# Patient Record
Sex: MALE | URBAN - METROPOLITAN AREA
[De-identification: names, ages, dates, MRNs, and addresses within clinical notes are randomized per-mention and may not be internally consistent; named-entity substitution may affect disease eponyms.]

---

## 2020-06-05 ENCOUNTER — OFFICE VISIT (OUTPATIENT)
Dept: URBAN - METROPOLITAN AREA CLINIC 98 | Facility: CLINIC | Age: 80
End: 2020-06-05
Payer: MEDICARE

## 2020-06-05 DIAGNOSIS — R10.84 GENERALIZED ABDOMINAL PAIN: ICD-10-CM

## 2020-06-05 DIAGNOSIS — K59.01 CONSTIPATION BY DELAYED COLONIC TRANSIT: ICD-10-CM

## 2020-06-05 PROCEDURE — 99203 OFFICE O/P NEW LOW 30 MIN: CPT | Performed by: INTERNAL MEDICINE

## 2020-06-05 PROCEDURE — 99204 OFFICE O/P NEW MOD 45 MIN: CPT | Performed by: INTERNAL MEDICINE

## 2020-06-05 PROCEDURE — 1036F TOBACCO NON-USER: CPT | Performed by: INTERNAL MEDICINE

## 2020-06-05 PROCEDURE — G8420 CALC BMI NORM PARAMETERS: HCPCS | Performed by: INTERNAL MEDICINE

## 2020-06-05 PROCEDURE — G9903 PT SCRN TBCO ID AS NON USER: HCPCS | Performed by: INTERNAL MEDICINE

## 2020-06-05 RX ORDER — FINASTERIDE 5 MG/1
1 TABLET TABLET, FILM COATED ORAL ONCE A DAY
Status: ACTIVE | COMMUNITY

## 2020-06-05 RX ORDER — LACTULOSE 10 G/15ML
15 ML SOLUTION ORAL ONCE A DAY
Qty: 450 ML | Refills: 3 | OUTPATIENT
Start: 2020-06-05 | End: 2020-10-03

## 2020-06-05 RX ORDER — ATORVASTATIN CALCIUM 10 MG/1
1 TABLET TABLET, FILM COATED ORAL ONCE A DAY
Status: ACTIVE | COMMUNITY

## 2020-06-05 NOTE — HPI-TODAY'S VISIT:
Having issues with abdominal pain for 2-3 weeks.  Constant discomfort. No relation to eating.  Worse with BM and straining.  More constipated. No bleeding.  Low appetite due to poor dentition.  No nausea or vomiting.  No GERD or dysphagia.  No family history of colon cancer.  Has tried mineral oil with no improvement.  Last EGD and colon in 2019 per wife and was normal at [facility]

## 2020-09-04 ENCOUNTER — ERX REFILL RESPONSE (OUTPATIENT)
Age: 80
End: 2020-09-04

## 2020-09-04 RX ORDER — LACTULOSE SOLUTION USP, 10 G/15 ML 10 G/15ML
TAKE 15 ML BY MOUTH ONE TIME DAILY SOLUTION ORAL; RECTAL
Qty: 450 | Refills: 3

## 2021-02-02 ENCOUNTER — DASHBOARD ENCOUNTERS (OUTPATIENT)
Age: 81
End: 2021-02-02

## 2021-02-04 ENCOUNTER — OFFICE VISIT (OUTPATIENT)
Dept: URBAN - METROPOLITAN AREA CLINIC 98 | Facility: CLINIC | Age: 81
End: 2021-02-04
Payer: MEDICARE

## 2021-02-04 VITALS
SYSTOLIC BLOOD PRESSURE: 144 MMHG | HEART RATE: 94 BPM | BODY MASS INDEX: 18.61 KG/M2 | TEMPERATURE: 95.6 F | HEIGHT: 61 IN | WEIGHT: 98.6 LBS | DIASTOLIC BLOOD PRESSURE: 87 MMHG

## 2021-02-04 DIAGNOSIS — K59.01 CONSTIPATION BY DELAYED COLONIC TRANSIT: ICD-10-CM

## 2021-02-04 DIAGNOSIS — R10.84 GENERALIZED ABDOMINAL PAIN: ICD-10-CM

## 2021-02-04 PROCEDURE — 1036F TOBACCO NON-USER: CPT | Performed by: INTERNAL MEDICINE

## 2021-02-04 PROCEDURE — 99214 OFFICE O/P EST MOD 30 MIN: CPT | Performed by: INTERNAL MEDICINE

## 2021-02-04 PROCEDURE — G8420 CALC BMI NORM PARAMETERS: HCPCS | Performed by: INTERNAL MEDICINE

## 2021-02-04 RX ORDER — LACTULOSE SOLUTION USP, 10 G/15 ML 10 G/15ML
TAKE 15 ML BY MOUTH ONE TIME DAILY SOLUTION ORAL; RECTAL
Qty: 450 | Refills: 3 | Status: ACTIVE | COMMUNITY

## 2021-02-04 RX ORDER — FINASTERIDE 5 MG/1
1 TABLET TABLET, FILM COATED ORAL ONCE A DAY
Status: ACTIVE | COMMUNITY

## 2021-02-04 RX ORDER — LACTULOSE 10 G/15ML
15 ML SOLUTION ORAL ONCE A DAY
Qty: 450 ML | Refills: 6 | OUTPATIENT
Start: 2021-02-04

## 2021-02-04 RX ORDER — ATORVASTATIN CALCIUM 10 MG/1
1 TABLET TABLET, FILM COATED ORAL ONCE A DAY
Status: ACTIVE | COMMUNITY

## 2021-02-04 NOTE — HPI-TODAY'S VISIT:
Having issues with abdominal pain for 2-3 weeks.  Constant discomfort. No relation to eating.  Worse with BM and straining.  More constipated. No bleeding.  Low appetite due to poor dentition.  No nausea or vomiting.  No GERD or dysphagia.  No family history of colon cancer.  Has tried mineral oil with no improvement.  Last EGD and colon in 2019 per wife and was normal at [facility]

## 2021-02-22 ENCOUNTER — TELEPHONE ENCOUNTER (OUTPATIENT)
Dept: URBAN - METROPOLITAN AREA CLINIC 98 | Facility: CLINIC | Age: 81
End: 2021-02-22

## 2021-02-22 RX ORDER — LACTULOSE 10 G/15ML
15 ML SOLUTION ORAL ONCE A DAY
Qty: 450 ML | Refills: 6
Start: 2021-02-04